# Patient Record
Sex: MALE | Race: BLACK OR AFRICAN AMERICAN | NOT HISPANIC OR LATINO | Employment: OTHER | URBAN - METROPOLITAN AREA
[De-identification: names, ages, dates, MRNs, and addresses within clinical notes are randomized per-mention and may not be internally consistent; named-entity substitution may affect disease eponyms.]

---

## 2017-03-28 RX ORDER — LISINOPRIL AND HYDROCHLOROTHIAZIDE 12.5; 2 MG/1; MG/1
TABLET ORAL
Qty: 180 TABLET | Refills: 1 | Status: ON HOLD | OUTPATIENT
Start: 2017-03-28 | End: 2022-05-31 | Stop reason: SDUPTHER

## 2019-06-11 ENCOUNTER — OFFICE VISIT (OUTPATIENT)
Dept: UROLOGY | Facility: CLINIC | Age: 79
End: 2019-06-11
Payer: MEDICARE

## 2019-06-11 ENCOUNTER — LAB VISIT (OUTPATIENT)
Dept: LAB | Facility: HOSPITAL | Age: 79
End: 2019-06-11
Attending: NURSE PRACTITIONER
Payer: MEDICARE

## 2019-06-11 ENCOUNTER — TELEPHONE (OUTPATIENT)
Dept: UROLOGY | Facility: CLINIC | Age: 79
End: 2019-06-11

## 2019-06-11 VITALS — SYSTOLIC BLOOD PRESSURE: 174 MMHG | HEIGHT: 68 IN | BODY MASS INDEX: 22.35 KG/M2 | DIASTOLIC BLOOD PRESSURE: 74 MMHG

## 2019-06-11 DIAGNOSIS — R35.0 URINARY FREQUENCY: ICD-10-CM

## 2019-06-11 DIAGNOSIS — R35.1 NOCTURIA: ICD-10-CM

## 2019-06-11 DIAGNOSIS — Z90.5 HISTORY OF NEPHRECTOMY: ICD-10-CM

## 2019-06-11 DIAGNOSIS — N40.1 BPH WITH URINARY OBSTRUCTION: ICD-10-CM

## 2019-06-11 DIAGNOSIS — N13.8 BPH WITH URINARY OBSTRUCTION: ICD-10-CM

## 2019-06-11 DIAGNOSIS — C64.1 RENAL CANCER, RIGHT: ICD-10-CM

## 2019-06-11 DIAGNOSIS — N40.1 BPH WITH URINARY OBSTRUCTION: Primary | ICD-10-CM

## 2019-06-11 DIAGNOSIS — N13.8 BPH WITH URINARY OBSTRUCTION: Primary | ICD-10-CM

## 2019-06-11 LAB
ANION GAP SERPL CALC-SCNC: 7 MMOL/L (ref 8–16)
BILIRUB SERPL-MCNC: NORMAL MG/DL
BLOOD URINE, POC: NORMAL
BUN SERPL-MCNC: 34 MG/DL (ref 8–23)
CALCIUM SERPL-MCNC: 10.1 MG/DL (ref 8.7–10.5)
CHLORIDE SERPL-SCNC: 104 MMOL/L (ref 95–110)
CO2 SERPL-SCNC: 27 MMOL/L (ref 23–29)
COLOR, POC UA: YELLOW
CREAT SERPL-MCNC: 1.5 MG/DL (ref 0.5–1.4)
EST. GFR  (AFRICAN AMERICAN): 50.8 ML/MIN/1.73 M^2
EST. GFR  (NON AFRICAN AMERICAN): 44 ML/MIN/1.73 M^2
GLUCOSE SERPL-MCNC: 106 MG/DL (ref 70–110)
GLUCOSE UR QL STRIP: NORMAL
KETONES UR QL STRIP: NORMAL
LEUKOCYTE ESTERASE URINE, POC: NORMAL
NITRITE, POC UA: NORMAL
PH, POC UA: 5
POC RESIDUAL URINE VOLUME: 38 ML (ref 0–100)
POTASSIUM SERPL-SCNC: 4 MMOL/L (ref 3.5–5.1)
PROTEIN, POC: NORMAL
SODIUM SERPL-SCNC: 138 MMOL/L (ref 136–145)
SPECIFIC GRAVITY, POC UA: 1.01
UROBILINOGEN, POC UA: NORMAL

## 2019-06-11 PROCEDURE — 3078F DIAST BP <80 MM HG: CPT | Mod: CPTII,S$GLB,, | Performed by: NURSE PRACTITIONER

## 2019-06-11 PROCEDURE — 81002 POCT URINE DIPSTICK WITHOUT MICROSCOPE: ICD-10-PCS | Mod: S$GLB,,, | Performed by: NURSE PRACTITIONER

## 2019-06-11 PROCEDURE — 84153 ASSAY OF PSA TOTAL: CPT

## 2019-06-11 PROCEDURE — 99203 PR OFFICE/OUTPT VISIT, NEW, LEVL III, 30-44 MIN: ICD-10-PCS | Mod: 25,S$GLB,, | Performed by: NURSE PRACTITIONER

## 2019-06-11 PROCEDURE — 84154 ASSAY OF PSA FREE: CPT

## 2019-06-11 PROCEDURE — 99203 OFFICE O/P NEW LOW 30 MIN: CPT | Mod: 25,S$GLB,, | Performed by: NURSE PRACTITIONER

## 2019-06-11 PROCEDURE — 99999 PR PBB SHADOW E&M-EST. PATIENT-LVL III: ICD-10-PCS | Mod: PBBFAC,,, | Performed by: NURSE PRACTITIONER

## 2019-06-11 PROCEDURE — 99999 PR PBB SHADOW E&M-EST. PATIENT-LVL III: CPT | Mod: PBBFAC,,, | Performed by: NURSE PRACTITIONER

## 2019-06-11 PROCEDURE — 3078F PR MOST RECENT DIASTOLIC BLOOD PRESSURE < 80 MM HG: ICD-10-PCS | Mod: CPTII,S$GLB,, | Performed by: NURSE PRACTITIONER

## 2019-06-11 PROCEDURE — 51798 US URINE CAPACITY MEASURE: CPT | Mod: S$GLB,,, | Performed by: NURSE PRACTITIONER

## 2019-06-11 PROCEDURE — 3077F SYST BP >= 140 MM HG: CPT | Mod: CPTII,S$GLB,, | Performed by: NURSE PRACTITIONER

## 2019-06-11 PROCEDURE — 81002 URINALYSIS NONAUTO W/O SCOPE: CPT | Mod: S$GLB,,, | Performed by: NURSE PRACTITIONER

## 2019-06-11 PROCEDURE — 3077F PR MOST RECENT SYSTOLIC BLOOD PRESSURE >= 140 MM HG: ICD-10-PCS | Mod: CPTII,S$GLB,, | Performed by: NURSE PRACTITIONER

## 2019-06-11 PROCEDURE — 80048 BASIC METABOLIC PNL TOTAL CA: CPT

## 2019-06-11 PROCEDURE — 51798 PR MEAS,POST-VOID RES,US,NON-IMAGING: ICD-10-PCS | Mod: S$GLB,,, | Performed by: NURSE PRACTITIONER

## 2019-06-11 PROCEDURE — 36415 COLL VENOUS BLD VENIPUNCTURE: CPT

## 2019-06-11 RX ORDER — OXYBUTYNIN CHLORIDE 5 MG/1
5 TABLET ORAL NIGHTLY
Qty: 30 TABLET | Refills: 11 | Status: SHIPPED | OUTPATIENT
Start: 2019-06-11 | End: 2022-05-30

## 2019-06-11 RX ORDER — TAMSULOSIN HYDROCHLORIDE 0.4 MG/1
0.4 CAPSULE ORAL DAILY
Qty: 90 CAPSULE | Refills: 3 | Status: SHIPPED | OUTPATIENT
Start: 2019-06-11 | End: 2023-06-25

## 2019-06-11 NOTE — PROGRESS NOTES
Subjective:       Patient ID: Adrian Claudet is a 78 y.o. male.    Chief Complaint: Urinary Frequency    Adrian Claudet is a 78 y.o. Male with history of BPH, RCC; s/p (R) nephrectomy 05/27/2015.  PAPILLARY RENAL CELL CARCINOMA, MICHELET GRADE 3, MAXIMUM TUMOR SIZE 4 CM,   DOES NOT INVOLVE SURGICAL MARGINS.  NO EXTRACAPSULAR EXTENSION IDENTIFIED; pT1, pN0    He was last seen in clinic with Dr. Lee 04/12/2016.  That was the last recorded imaging and labs in [facility]  04/12/2016:  BUN 29  Cr     1.85                           PSA                      1.1                 08/05/2009                He is here today due to urinary frequency/nocturia 3-4x.  FOS good for him. No dysuria or hematuria.  Has control of his urine.  He has been taking flomax for his LUTS;          Past Medical History:  No date: Bradycardia  No date: Colon polyp      Comment:  benign  No date: Diabetes mellitus  No date: Hyperlipidemia  No date: Hypertension  No date: Inflammatory bowel disease      Comment:  diverticulitis  No date: Ulcer  No date: Unspecified disorder of kidney and ureter      Comment:  right kidney mass    Past Surgical History:  No date: CHOLECYSTECTOMY  No date: COLON SURGERY  No date: NEPHRECTOMY      Comment:  right  5/27/2015: NEPHRECTOMY-LAPAROSCOPIC; Right      Comment:  Performed by Melchor Lee MD at [facility] OR [address] [place] [address]    Review of patient's family history indicates:  Problem: Cancer      Relation: Mother          Age of Onset: (Not Specified)      Social History    Socioeconomic History      Marital status:       Spouse name: Not on file      Number of children: Not on file      Years of education: Not on file      Highest education level: Not on file    Occupational History      Not on file    Social Needs      Financial resource strain: Not on file      Food insecurity:        Worry: Not on file        Inability: Not on file      Transportation needs:        Medical: Not on file        Non-medical: Not on  file    Tobacco Use      Smoking status: Never Smoker    Substance and Sexual Activity      Alcohol use: No        Alcohol/week: 0.0 oz      Drug use: Not on file      Sexual activity: Yes        Partners: Female    Lifestyle      Physical activity:        Days per week: Not on file        Minutes per session: Not on file      Stress: Not on file    Relationships      Social connections:        Talks on phone: Not on file        Gets together: Not on file        Attends Yazdanism service: Not on file        Active member of club or organization: Not on file        Attends meetings of clubs or organizations: Not on file        Relationship status: Not on file    Other Topics      Concerns:        Not on file    Social History Narrative      Not on file      Allergies:  Patient has no known allergies.    Medications:  Current Outpatient Medications:   atorvastatin (LIPITOR) 20 MG tablet, Take 20 mg by mouth once daily. , Disp: , Rfl: 0  glimepiride (AMARYL) 2 MG tablet, Take 2 mg by mouth 2 (two) times daily. , Disp: , Rfl: 3  lisinopril-hydrochlorothiazide (PRINZIDE,ZESTORETIC) 20-12.5 mg per tablet, TAKE 2 TABLETS BY MOUTH ONCE DAILY, Disp: 180 tablet, Rfl: 11  lisinopril-hydrochlorothiazide (PRINZIDE,ZESTORETIC) 20-12.5 mg per tablet, TAKE 2 TABLETS BY MOUTH ONCE DAILY, Disp: 180 tablet, Rfl: 1  metformin (GLUCOPHAGE) 500 MG tablet, Take 500 mg by mouth 2 (two) times daily with meals. , Disp: , Rfl: 3  pantoprazole (PROTONIX) 40 MG tablet, Take 40 mg by mouth once daily. , Disp: , Rfl: 5  tamsulosin (FLOMAX) 0.4 mg Cap, Take 1 capsule (0.4 mg total) by mouth once daily., Disp: 90 capsule, Rfl: 3  tramadol (ULTRAM) 50 mg tablet, Take 50 mg by mouth every 6 (six) hours as needed. , Disp: , Rfl: 2  oxybutynin (DITROPAN) 5 MG Tab, Take 1 tablet (5 mg total) by mouth every evening., Disp: 30 tablet, Rfl: 11                    Review of Systems   Constitutional: Negative for activity change, appetite change,  chills and fever.   HENT: Negative for facial swelling and trouble swallowing.    Eyes: Negative for visual disturbance.   Respiratory: Negative for chest tightness and shortness of breath.    Cardiovascular: Negative for chest pain and palpitations.   Gastrointestinal: Positive for constipation. Negative for abdominal pain, diarrhea, nausea and vomiting.   Genitourinary: Positive for frequency and nocturia. Negative for difficulty urinating, dysuria, flank pain, hematuria, penile pain, penile swelling, scrotal swelling and testicular pain.   Musculoskeletal: Negative for back pain, gait problem, myalgias and neck stiffness.   Skin: Negative for rash.   Neurological: Negative for dizziness and speech difficulty.   Hematological: Does not bruise/bleed easily.   Psychiatric/Behavioral: Negative for behavioral problems.       Objective:      Physical Exam   Nursing note and vitals reviewed.  Constitutional: He is oriented to person, place, and time. He appears well-developed and well-nourished.  Non-toxic appearance. He does not have a sickly appearance.   Urine dipped clear of infection today.  PVR in the office today was 38.       HENT:   Head: Normocephalic and atraumatic.   Right Ear: External ear normal.   Left Ear: External ear normal.   Nose: Nose normal.   Mouth/Throat: Mucous membranes are normal.   Eyes: Conjunctivae and lids are normal. No scleral icterus.   Neck: Trachea normal, normal range of motion and full passive range of motion without pain. Neck supple. No JVD present. No tracheal deviation present.   Cardiovascular: Normal rate, S1 normal and S2 normal.    Pulmonary/Chest: Effort normal. No respiratory distress. He exhibits no tenderness.   Abdominal: Soft. Normal appearance and bowel sounds are normal. There is no hepatosplenomegaly. There is no tenderness. There is no CVA tenderness.   Genitourinary: Rectum normal and penis normal.   Musculoskeletal: Normal range of motion.   Neurological: He is  alert and oriented to person, place, and time. He has normal strength.   Skin: Skin is warm, dry and intact.     Psychiatric: He has a normal mood and affect. His behavior is normal. Judgment and thought content normal.       Assessment:       1. BPH with urinary obstruction    2. Urinary frequency    3. Renal cancer, right    4. History of nephrectomy    5. Nocturia        Plan:         I spent 30 minutes with the patient of which more than half was spent in direct consultation with the patient in regards to our treatment and plan.    Education and recommendations of today's plan of care including home remedies.  We discussed the findings and recommendations  Need renal u/s and labs today.  Discussed the contributory factors for his LUTS  Reassurance no infection and no having issues with retention.  Continue Flomax; trial oxybutynin 5 mg just in the evening; discussed the benefits and side effects of the oxybutynin.  This is why we only starting lower dose.  F/u based on his labs and imaging.  Check luts once back

## 2019-06-11 NOTE — TELEPHONE ENCOUNTER
----- Message from Angy Hampton sent at 6/11/2019  8:04 AM CDT -----  Contact: Velvet- Spouse- [phone]  Bardot- pts wife called to schedule a np appt- pt trying to re est care for cancer of kidney last seen 4/12/16- wife states pt is unable to urinate and has been in some pain since the weekend- pain is occurring on the left side- please contact Velvet at [phone]

## 2019-06-13 ENCOUNTER — HOSPITAL ENCOUNTER (OUTPATIENT)
Dept: RADIOLOGY | Facility: HOSPITAL | Age: 79
Discharge: HOME | End: 2019-06-13
Attending: NURSE PRACTITIONER
Payer: MEDICARE

## 2019-06-13 DIAGNOSIS — N13.8 BPH WITH URINARY OBSTRUCTION: ICD-10-CM

## 2019-06-13 DIAGNOSIS — N40.1 BPH WITH URINARY OBSTRUCTION: ICD-10-CM

## 2019-06-13 DIAGNOSIS — R35.0 URINARY FREQUENCY: ICD-10-CM

## 2019-06-13 LAB
-2 PROPSA (PHI): 16.3 PG/ML
FREE PSA (PHI): 1.4 NG/ML
PROSTATE HEALTH INDEX VALUE (PHI): NORMAL
PSA FREE MFR SERPL: NORMAL %
PSA SERPL-MCNC: 3.5 NG/ML

## 2019-06-13 PROCEDURE — 76770 US EXAM ABDO BACK WALL COMP: CPT | Mod: TC,PO

## 2019-06-14 ENCOUNTER — TELEPHONE (OUTPATIENT)
Dept: UROLOGY | Facility: CLINIC | Age: 79
End: 2019-06-14

## 2019-06-14 NOTE — TELEPHONE ENCOUNTER
Patient notified  that his labs and u/s look great.   No sign of PCA or reoccurrence in kidney.   He clear; following the AUA guidelines   Any problems let us know; can f/u with PCP

## 2022-05-30 ENCOUNTER — HOSPITAL ENCOUNTER (OUTPATIENT)
Facility: HOSPITAL | Age: 82
Discharge: HOME | End: 2022-05-31
Attending: EMERGENCY MEDICINE | Admitting: EMERGENCY MEDICINE
Payer: MEDICARE

## 2022-05-30 DIAGNOSIS — K92.1 MELENA: ICD-10-CM

## 2022-05-30 DIAGNOSIS — D64.9 NORMOCYTIC ANEMIA: ICD-10-CM

## 2022-05-30 DIAGNOSIS — I10 HTN (HYPERTENSION), BENIGN: ICD-10-CM

## 2022-05-30 DIAGNOSIS — N18.31 STAGE 3A CHRONIC KIDNEY DISEASE: ICD-10-CM

## 2022-05-30 DIAGNOSIS — R07.9 CHEST PAIN: ICD-10-CM

## 2022-05-30 DIAGNOSIS — K92.2 UPPER GI BLEED: Primary | ICD-10-CM

## 2022-05-30 DIAGNOSIS — E78.5 HYPERLIPIDEMIA, UNSPECIFIED HYPERLIPIDEMIA TYPE: ICD-10-CM

## 2022-05-30 DIAGNOSIS — R63.4 UNINTENTIONAL WEIGHT LOSS: ICD-10-CM

## 2022-05-30 PROBLEM — N18.30 CKD (CHRONIC KIDNEY DISEASE) STAGE 3, GFR 30-59 ML/MIN: Status: ACTIVE | Noted: 2022-05-30

## 2022-05-30 LAB
ABO + RH BLD: NORMAL
ALBUMIN SERPL BCP-MCNC: 3.8 G/DL (ref 3.5–5.2)
ALP SERPL-CCNC: 45 U/L (ref 55–135)
ALT SERPL W/O P-5'-P-CCNC: 14 U/L (ref 10–44)
ANION GAP SERPL CALC-SCNC: 8 MMOL/L (ref 8–16)
APTT BLDCRRT: 25.8 SEC (ref 21–32)
AST SERPL-CCNC: 18 U/L (ref 10–40)
BASOPHILS # BLD AUTO: 0.03 K/UL (ref 0–0.2)
BASOPHILS NFR BLD: 0.9 % (ref 0–1.9)
BILIRUB SERPL-MCNC: 0.4 MG/DL (ref 0.1–1)
BLD GP AB SCN CELLS X3 SERPL QL: NORMAL
BUN SERPL-MCNC: 30 MG/DL (ref 8–23)
CALCIUM SERPL-MCNC: 9.1 MG/DL (ref 8.7–10.5)
CHLORIDE SERPL-SCNC: 103 MMOL/L (ref 95–110)
CO2 SERPL-SCNC: 26 MMOL/L (ref 23–29)
CREAT SERPL-MCNC: 1.8 MG/DL (ref 0.5–1.4)
DIFFERENTIAL METHOD: ABNORMAL
EOSINOPHIL # BLD AUTO: 0.1 K/UL (ref 0–0.5)
EOSINOPHIL NFR BLD: 3.5 % (ref 0–8)
ERYTHROCYTE [DISTWIDTH] IN BLOOD BY AUTOMATED COUNT: 14.9 % (ref 11.5–14.5)
EST. GFR  (AFRICAN AMERICAN): 40 ML/MIN/1.73 M^2
EST. GFR  (NON AFRICAN AMERICAN): 35 ML/MIN/1.73 M^2
ESTIMATED AVG GLUCOSE: 163 MG/DL (ref 68–131)
FERRITIN SERPL-MCNC: 115 NG/ML (ref 20–300)
GLUCOSE SERPL-MCNC: 222 MG/DL (ref 70–110)
HBA1C MFR BLD: 7.3 % (ref 4–5.6)
HCT VFR BLD AUTO: 29.9 % (ref 40–54)
HGB BLD-MCNC: 9.7 G/DL (ref 14–18)
IMM GRANULOCYTES # BLD AUTO: 0.01 K/UL (ref 0–0.04)
IMM GRANULOCYTES NFR BLD AUTO: 0.3 % (ref 0–0.5)
INR PPP: 1 (ref 0.8–1.2)
IRON SERPL-MCNC: 31 UG/DL (ref 45–160)
LIPASE SERPL-CCNC: 20 U/L (ref 4–60)
LYMPHOCYTES # BLD AUTO: 0.6 K/UL (ref 1–4.8)
LYMPHOCYTES NFR BLD: 18.7 % (ref 18–48)
MCH RBC QN AUTO: 31.1 PG (ref 27–31)
MCHC RBC AUTO-ENTMCNC: 32.4 G/DL (ref 32–36)
MCV RBC AUTO: 96 FL (ref 82–98)
MONOCYTES # BLD AUTO: 0.5 K/UL (ref 0.3–1)
MONOCYTES NFR BLD: 15.5 % (ref 4–15)
NEUTROPHILS # BLD AUTO: 2.1 K/UL (ref 1.8–7.7)
NEUTROPHILS NFR BLD: 61.1 % (ref 38–73)
NRBC BLD-RTO: 0 /100 WBC
OB PNL STL: NEGATIVE
PLATELET # BLD AUTO: 207 K/UL (ref 150–450)
PMV BLD AUTO: 9.7 FL (ref 9.2–12.9)
POCT GLUCOSE: 105 MG/DL (ref 70–110)
POCT GLUCOSE: 130 MG/DL (ref 70–110)
POCT GLUCOSE: 163 MG/DL (ref 70–110)
POTASSIUM SERPL-SCNC: 4.5 MMOL/L (ref 3.5–5.1)
PROT SERPL-MCNC: 6.6 G/DL (ref 6–8.4)
PROTHROMBIN TIME: 10.3 SEC (ref 9–12.5)
RBC # BLD AUTO: 3.12 M/UL (ref 4.6–6.2)
SARS-COV-2 RDRP RESP QL NAA+PROBE: NEGATIVE
SATURATED IRON: 10 % (ref 20–50)
SODIUM SERPL-SCNC: 137 MMOL/L (ref 136–145)
TOTAL IRON BINDING CAPACITY: 318 UG/DL (ref 250–450)
TRANSFERRIN SERPL-MCNC: 215 MG/DL (ref 200–375)
WBC # BLD AUTO: 3.42 K/UL (ref 3.9–12.7)

## 2022-05-30 PROCEDURE — 25000003 PHARM REV CODE 250: Performed by: STUDENT IN AN ORGANIZED HEALTH CARE EDUCATION/TRAINING PROGRAM

## 2022-05-30 PROCEDURE — 82272 OCCULT BLD FECES 1-3 TESTS: CPT | Performed by: EMERGENCY MEDICINE

## 2022-05-30 PROCEDURE — C9113 INJ PANTOPRAZOLE SODIUM, VIA: HCPCS | Performed by: EMERGENCY MEDICINE

## 2022-05-30 PROCEDURE — 83036 HEMOGLOBIN GLYCOSYLATED A1C: CPT | Performed by: STUDENT IN AN ORGANIZED HEALTH CARE EDUCATION/TRAINING PROGRAM

## 2022-05-30 PROCEDURE — 85610 PROTHROMBIN TIME: CPT | Performed by: STUDENT IN AN ORGANIZED HEALTH CARE EDUCATION/TRAINING PROGRAM

## 2022-05-30 PROCEDURE — 84466 ASSAY OF TRANSFERRIN: CPT | Performed by: STUDENT IN AN ORGANIZED HEALTH CARE EDUCATION/TRAINING PROGRAM

## 2022-05-30 PROCEDURE — G0378 HOSPITAL OBSERVATION PER HR: HCPCS

## 2022-05-30 PROCEDURE — 85025 COMPLETE CBC W/AUTO DIFF WBC: CPT | Performed by: EMERGENCY MEDICINE

## 2022-05-30 PROCEDURE — 99285 EMERGENCY DEPT VISIT HI MDM: CPT | Mod: NSCH,GC,, | Performed by: INTERNAL MEDICINE

## 2022-05-30 PROCEDURE — 96361 HYDRATE IV INFUSION ADD-ON: CPT

## 2022-05-30 PROCEDURE — 82728 ASSAY OF FERRITIN: CPT | Performed by: STUDENT IN AN ORGANIZED HEALTH CARE EDUCATION/TRAINING PROGRAM

## 2022-05-30 PROCEDURE — 63600175 PHARM REV CODE 636 W HCPCS: Performed by: STUDENT IN AN ORGANIZED HEALTH CARE EDUCATION/TRAINING PROGRAM

## 2022-05-30 PROCEDURE — 96374 THER/PROPH/DIAG INJ IV PUSH: CPT

## 2022-05-30 PROCEDURE — U0002 COVID-19 LAB TEST NON-CDC: HCPCS | Performed by: EMERGENCY MEDICINE

## 2022-05-30 PROCEDURE — 93010 EKG 12-LEAD: ICD-10-PCS | Mod: ,,, | Performed by: INTERNAL MEDICINE

## 2022-05-30 PROCEDURE — 96361 HYDRATE IV INFUSION ADD-ON: CPT | Performed by: EMERGENCY MEDICINE

## 2022-05-30 PROCEDURE — 86850 RBC ANTIBODY SCREEN: CPT | Performed by: INTERNAL MEDICINE

## 2022-05-30 PROCEDURE — 80053 COMPREHEN METABOLIC PANEL: CPT | Performed by: EMERGENCY MEDICINE

## 2022-05-30 PROCEDURE — C9113 INJ PANTOPRAZOLE SODIUM, VIA: HCPCS | Performed by: STUDENT IN AN ORGANIZED HEALTH CARE EDUCATION/TRAINING PROGRAM

## 2022-05-30 PROCEDURE — 63600175 PHARM REV CODE 636 W HCPCS: Performed by: EMERGENCY MEDICINE

## 2022-05-30 PROCEDURE — 85730 THROMBOPLASTIN TIME PARTIAL: CPT | Performed by: STUDENT IN AN ORGANIZED HEALTH CARE EDUCATION/TRAINING PROGRAM

## 2022-05-30 PROCEDURE — 99285 PR EMERGENCY DEPT VISIT,LEVEL V: ICD-10-PCS | Mod: NSCH,GC,, | Performed by: INTERNAL MEDICINE

## 2022-05-30 PROCEDURE — 99285 EMERGENCY DEPT VISIT HI MDM: CPT | Mod: 25

## 2022-05-30 PROCEDURE — 96376 TX/PRO/DX INJ SAME DRUG ADON: CPT | Performed by: EMERGENCY MEDICINE

## 2022-05-30 PROCEDURE — 93005 ELECTROCARDIOGRAM TRACING: CPT

## 2022-05-30 PROCEDURE — 82962 GLUCOSE BLOOD TEST: CPT

## 2022-05-30 PROCEDURE — 93010 ELECTROCARDIOGRAM REPORT: CPT | Mod: ,,, | Performed by: INTERNAL MEDICINE

## 2022-05-30 PROCEDURE — 83690 ASSAY OF LIPASE: CPT | Performed by: INTERNAL MEDICINE

## 2022-05-30 RX ORDER — MULTIVIT-MIN/FA/LYCOPEN/LUTEIN .4-300-25
1 TABLET ORAL DAILY
COMMUNITY

## 2022-05-30 RX ORDER — IBUPROFEN 200 MG
16 TABLET ORAL
Status: DISCONTINUED | OUTPATIENT
Start: 2022-05-30 | End: 2022-05-31 | Stop reason: HOSPADM

## 2022-05-30 RX ORDER — OXYBUTYNIN CHLORIDE 10 MG/1
10 TABLET, EXTENDED RELEASE ORAL DAILY
COMMUNITY
Start: 2022-03-31 | End: 2022-05-30 | Stop reason: ALTCHOICE

## 2022-05-30 RX ORDER — IBUPROFEN 200 MG
16 TABLET ORAL
Status: DISCONTINUED | OUTPATIENT
Start: 2022-05-30 | End: 2022-05-30

## 2022-05-30 RX ORDER — GLUCAGON 1 MG
1 KIT INJECTION
Status: DISCONTINUED | OUTPATIENT
Start: 2022-05-30 | End: 2022-05-30

## 2022-05-30 RX ORDER — DIPHENHYDRAMINE HCL 25 MG
25 CAPSULE ORAL DAILY PRN
COMMUNITY

## 2022-05-30 RX ORDER — IBUPROFEN 200 MG
24 TABLET ORAL
Status: DISCONTINUED | OUTPATIENT
Start: 2022-05-30 | End: 2022-05-30

## 2022-05-30 RX ORDER — IBUPROFEN 200 MG
24 TABLET ORAL
Status: DISCONTINUED | OUTPATIENT
Start: 2022-05-30 | End: 2022-05-31 | Stop reason: HOSPADM

## 2022-05-30 RX ORDER — GLUCAGON 1 MG
1 KIT INJECTION
Status: DISCONTINUED | OUTPATIENT
Start: 2022-05-30 | End: 2022-05-31 | Stop reason: HOSPADM

## 2022-05-30 RX ORDER — PANTOPRAZOLE SODIUM 40 MG/10ML
80 INJECTION, POWDER, LYOPHILIZED, FOR SOLUTION INTRAVENOUS DAILY
Status: DISCONTINUED | OUTPATIENT
Start: 2022-05-30 | End: 2022-05-30

## 2022-05-30 RX ORDER — SUCRALFATE 1 G/1
1 TABLET ORAL 3 TIMES DAILY
COMMUNITY
Start: 2022-05-20

## 2022-05-30 RX ORDER — NALOXONE HCL 0.4 MG/ML
0.02 VIAL (ML) INJECTION
Status: DISCONTINUED | OUTPATIENT
Start: 2022-05-30 | End: 2022-05-31 | Stop reason: HOSPADM

## 2022-05-30 RX ORDER — SODIUM CHLORIDE 0.9 % (FLUSH) 0.9 %
10 SYRINGE (ML) INJECTION EVERY 12 HOURS PRN
Status: DISCONTINUED | OUTPATIENT
Start: 2022-05-30 | End: 2022-05-31 | Stop reason: HOSPADM

## 2022-05-30 RX ORDER — FLUTICASONE PROPIONATE 50 MCG
2 SPRAY, SUSPENSION (ML) NASAL DAILY
Status: DISCONTINUED | OUTPATIENT
Start: 2022-05-30 | End: 2022-05-31 | Stop reason: HOSPADM

## 2022-05-30 RX ORDER — ACETAMINOPHEN 500 MG
500 TABLET ORAL EVERY 6 HOURS PRN
COMMUNITY

## 2022-05-30 RX ORDER — HYDRALAZINE HYDROCHLORIDE 25 MG/1
25 TABLET, FILM COATED ORAL ONCE
Status: COMPLETED | OUTPATIENT
Start: 2022-05-30 | End: 2022-05-30

## 2022-05-30 RX ORDER — FERROUS SULFATE 325(65) MG
325 TABLET, DELAYED RELEASE (ENTERIC COATED) ORAL DAILY
COMMUNITY

## 2022-05-30 RX ORDER — TAMSULOSIN HYDROCHLORIDE 0.4 MG/1
0.4 CAPSULE ORAL DAILY
Status: DISCONTINUED | OUTPATIENT
Start: 2022-05-30 | End: 2022-05-31 | Stop reason: HOSPADM

## 2022-05-30 RX ORDER — INSULIN ASPART 100 [IU]/ML
0-5 INJECTION, SOLUTION INTRAVENOUS; SUBCUTANEOUS
Status: DISCONTINUED | OUTPATIENT
Start: 2022-05-30 | End: 2022-05-31 | Stop reason: HOSPADM

## 2022-05-30 RX ORDER — PANTOPRAZOLE SODIUM 40 MG/10ML
40 INJECTION, POWDER, LYOPHILIZED, FOR SOLUTION INTRAVENOUS 2 TIMES DAILY
Status: DISCONTINUED | OUTPATIENT
Start: 2022-05-30 | End: 2022-05-31 | Stop reason: HOSPADM

## 2022-05-30 RX ORDER — INSULIN ASPART 100 [IU]/ML
0-5 INJECTION, SOLUTION INTRAVENOUS; SUBCUTANEOUS
Status: DISCONTINUED | OUTPATIENT
Start: 2022-05-30 | End: 2022-05-30

## 2022-05-30 RX ORDER — CETIRIZINE HYDROCHLORIDE 5 MG/1
5 TABLET ORAL DAILY
Status: DISCONTINUED | OUTPATIENT
Start: 2022-05-30 | End: 2022-05-31 | Stop reason: HOSPADM

## 2022-05-30 RX ADMIN — SODIUM CHLORIDE, SODIUM LACTATE, POTASSIUM CHLORIDE, AND CALCIUM CHLORIDE 1000 ML: .6; .31; .03; .02 INJECTION, SOLUTION INTRAVENOUS at 03:05

## 2022-05-30 RX ADMIN — PANTOPRAZOLE SODIUM 40 MG: 40 INJECTION, POWDER, LYOPHILIZED, FOR SOLUTION INTRAVENOUS at 08:05

## 2022-05-30 RX ADMIN — CETIRIZINE HYDROCHLORIDE 5 MG: 5 TABLET, FILM COATED ORAL at 05:05

## 2022-05-30 RX ADMIN — HYDRALAZINE HYDROCHLORIDE 25 MG: 25 TABLET, FILM COATED ORAL at 07:05

## 2022-05-30 RX ADMIN — PANTOPRAZOLE SODIUM 80 MG: 40 INJECTION, POWDER, FOR SOLUTION INTRAVENOUS at 01:05

## 2022-05-30 RX ADMIN — TAMSULOSIN HYDROCHLORIDE 0.4 MG: 0.4 CAPSULE ORAL at 03:05

## 2022-05-30 NOTE — PHARMACY MED REC
"  Admission Medication History     The home medication history was taken by Edelmira Ramsey CPhT.    Medication history obtained from, Patient's Wife Verified    You may go to "Admission" then "Reconcile Home Medications" tabs to review and/or act upon these items.      The home medication list has been updated by the Pharmacy department.    Please read ALL comments highlighted in yellow.    Please address this information as you see fit.     Feel free to contact us if you have any questions or require assistance.      The medications listed below were removed from the home medication list.  Please reorder if appropriate:  Patient reports no longer taking the following medication(s):   Atorvastatin 20 mg   Oxybutynin 5 mg    Edelmira Ramsey CPhT.  Ext [phone]              .          "

## 2022-05-30 NOTE — ED TRIAGE NOTES
Adrian Claudet, an 81 y.o. male presents to the ED  With a  complaint of fatigue, weight loss, trouble, anemia, trouble wth BP and diarrhea.      Review of patient's allergies indicates:  No Known Allergies  Chief Complaint   Patient presents with    multiple complaints     Pt reports he was unable to check his blood pressure at home because he's out of batteries. Pt also states he's anemic and has had dark stool for several months. Pt also has sinus congestion and arthritis pain.      Past Medical History:   Diagnosis Date    Bradycardia     Colon polyp     benign    Diabetes mellitus     Hyperlipidemia     Hypertension     Inflammatory bowel disease     diverticulitis    Ulcer     Unspecified disorder of kidney and ureter     right kidney mass       Patient identifiers verified and correct.     LOC: The patient is awake, alert and aware of environment with an appropriate affect, the patient is oriented x 3 and speaking appropriately.     APPEARANCE: Patient appears comfortable and in no acute distress, patient is clean and well groomed.    HEENT: Head symmetrical. Eyes bilateral.  Bilateral ears without drainage. Bilateral nares patent, throat clear.    SKIN: The skin is warm and dry, color consistent with ethnicity, patient has normal skin turgor and moist mucus membranes, skin intact, no breakdown or bruising noted.     MUSCULOSKELETAL: Patient moving all extremities spontaneously, no swelling noted.    RESPIRATORY: Airway is open and patent, respirations are spontaneous, patient has a normal effort and rate, no accessory muscle use noted.     CARDIAC: Patient has a normal rate and regular rhythm, no edema noted, capillary refill < 3 seconds.     GASTRO: Abdomen soft and non-distended. No tenderness noted on palpation in all four quadrants, normoactive bowel sounds present in all four quadrants.     NEURO: Pt opens eyes spontaneously pupils equal, round, and reactive. behavior appropriate to  situation, follows commands, facial expression symmetrical,    NEUROVASCULAR: All extremities are warm and pink.       Will continue to monitor.

## 2022-05-30 NOTE — ED PROVIDER NOTES
"Chief Complaint: black tarry stools     History of Present Illness:    Adrian Claudet 81 y.o. with a  has a past medical history of Bradycardia, Colon polyp, Diabetes mellitus, Hyperlipidemia, Hypertension, Inflammatory bowel disease, Ulcer, and Unspecified disorder of kidney and ureter. who presents to the emergency department today with a complaint of dark tarry stools. He has had a bleeding ulcer in the past that required interventions. He has had on and off black stools for a few months but getting worse. He denies any excessive NSAID/ETOH use. Denies abdominal pain. He has had some nasal congestion. No syncope, no lightheadedness.        ROS    Constitutional: No fever, no chills.  Eyes: No discharge. No pain.  HENT: No nasal drainage. No ear ache. No sore throat.  Cardiovascular: No chest pain, no palpitations.  Respiratory: No cough, no shortness of breath.  Gastrointestinal: No abdominal pain, no vomiting. No diarrhea.  Genitourinary: No hematuria, dysuria, urgency.  Musculoskeletal: No back pain.   Skin: No rashes, no lesions.  Neurological: No headache, no focal weakness.    Otherwise remaining ROS negative     The history is provided by the patient      ALLERGIES REVIEWED  MEDICATIONS REVIEWED  PMH/PSH/SOC/FH REVIEWED :  Adrian Claudet  has a past medical history of Bradycardia, Colon polyp, Diabetes mellitus, Hyperlipidemia, Hypertension, Inflammatory bowel disease, Ulcer, and Unspecified disorder of kidney and ureter. and   has a past surgical history that includes Cholecystectomy; Colon surgery; and Nephrectomy. with  reports that he has never smoked. He does not have any smokeless tobacco history on file. He reports that he does not drink alcohol. and a family history includes Cancer in his mother.      Nursing/Ancillary staff note reviewed.  VS reviewed         Physical Exam     BP (!) 178/78   Pulse (!) 51   Temp 97.6 °F (36.4 °C) (Oral)   Resp 18   Ht 5' 7" (1.702 m)   Wt 61.7 kg (136 lb)   " SpO2 100%   BMI 21.30 kg/m²     Physical Exam    General Appearance: The patient is alert, hard of hearing, has no immediate need for airway protection and no signs of toxicity. No acute distress. Lying in bed but able to sit up without difficulty.   HEENT: Eyes: Pupils equal and round no pallor or injection. Extra ocular movements intact. No drainage.       Mouth: Mucous membranes are moist. Oropharynx clear.   Neck:Neck is supple non-tender. No lymphadenopathy. No stridor.   Respiratory: There are no retractions, lungs are clear to auscultation. No wheezing, no crackles. Chest wall nontender to palpation.   Cardiovascular: Regular rate and rhythm. No murmurs, rubs or gallops.  Gastrointestinal:  Abdomen is soft and non-tender, no masses, bowel sounds normal. No guarding, no rebound.  No pulsatile mass.   Neurological: Alert and oriented x 4. CN II-XII grossly intact. No focal weakness. Strength intact 5/5 bilaterally in upper and lower extremities.   Skin: Warm and dry, no rashes.  Musculoskeletal: Extremities are non-tender, non-swollen and have full range of motion. Back NTTP along the midline.   Rectal: normal tone, black stool in vault     DIFFERENTIAL DIAGNOSIS: After history and physical exam a differential diagnosis was considered, but was not limited to, gastritis, esophagitis, ulcer, Mayra Soledad tear, esophageal varices, enteritis, colitis, anticoagulation toxicity, tumor, AVM.           I independently interpreted the lab results and it showed the following: Anemic at 9.7/29.9 cmp with CKD          ED Course     Medications   tamsulosin 24 hr capsule 0.4 mg (has no administration in time range)   sodium chloride 0.9% flush 10 mL (has no administration in time range)   naloxone 0.4 mg/mL injection 0.02 mg (has no administration in time range)   dextrose 10% bolus 125 mL (has no administration in time range)   dextrose 10% bolus 250 mL (has no administration in time range)   pantoprazole injection 40 mg  (has no administration in time range)   lactated ringers bolus 1,000 mL (has no administration in time range)   glucose chewable tablet 16 g (has no administration in time range)   glucose chewable tablet 24 g (has no administration in time range)   glucagon (human recombinant) injection 1 mg (has no administration in time range)   insulin aspart U-100 pen 0-5 Units (has no administration in time range)         ED Course as of 05/30/22 1434   Mon May 30, 2022   1340 I spoke with [facility] re:the pts presentation and workup and they will accept for admission.  [JA]   1429 I spoke with [facility], Dr Vásquez, they will follow along.  [JA]      ED Course User Index  [JA] Guilherme Shipley MD              Medical Decision Making:   History:   I obtained history from:  The patient  Old Medical Records: I decided to obtain old medical records.   Reviewed and summarized the old medical record and it showed: pts PCP is Ly Bonilla MD.           Clinical Tests:   I have ordered and independently interpreted Lab Tests: Ordered and Reviewed        ED Management:  Adrian Claudet  presents to the emergency Department today with melena. He has anemia on labs no need for transfusion at this time however given history of bleeding ulcer and his increased melena it is in his best interest to be admitted for further care and serial Hgb/Hcts. Pt is in agreement with plan.          Voice recognition software utilized in this note.              Impression    The primary encounter diagnosis was Upper GI bleed. Diagnoses of Melena were also pertinent to this visit.                   Guilherme Shipley MD  05/30/22 1436

## 2022-05-30 NOTE — CONSULTS
"[facility]    CC: melena    HPI 81 y.o. male with PMH significant for HTN, CKD3b, right RCC, bradycardia GI consulted for subacute, persistent, mild melena associated with normocytic anemia.      Past Medical History  Past Medical History:   Diagnosis Date    Bradycardia     Colon polyp     benign    Diabetes mellitus     Hyperlipidemia     Hypertension     Inflammatory bowel disease     diverticulitis    Ulcer     Unspecified disorder of kidney and ureter     right kidney mass       Past Surgical History  Past Surgical History:   Procedure Laterality Date    CHOLECYSTECTOMY      COLON SURGERY      NEPHRECTOMY      right       Social History  Social History     Tobacco Use    Smoking status: Never Smoker   Substance Use Topics    Alcohol use: No     Alcohol/week: 0.0 standard drinks       Family History  No FHx of GI malignancy    Review of Systems  General ROS: +weight loss. negative for chills, fever  Psychological ROS: negative for hallucination, depression, anxiety, or suicidal ideation  Ophthalmic ROS: negative for blurry vision, photophobia or eye pain  ENT ROS: negative for epistaxis, sore throat or rhinorrhea  Respiratory ROS: no cough, shortness of breath, or wheezing  Cardiovascular ROS: +GUDINO. No chest pain  Gastrointestinal ROS: +melena. No abdominal pain, change in bowel habits, nausea, emesis, hematochezia  Musculoskeletal ROS: +generalized weakness. No arthralgias  Neurological ROS: no syncope or seizures; no ataxia  Dermatological ROS: negative for pruritis, rash and jaundice    Physical Examination  BP (!) 178/78   Pulse (!) 51   Temp 97.6 °F (36.4 °C) (Oral)   Resp 18   Ht 5' 7" (1.702 m)   Wt 61.7 kg (136 lb)   SpO2 100%   BMI 21.30 kg/m²   General appearance: alert, cooperative, thin  HENT: Normocephalic, atraumatic, neck symmetrical, no nasal discharge   Eyes: no scleral icterus, PERRL, EOM's intact  Heart: bradycardic, regular rhythm without rub; no displacement of " the PMI   Abdomen: soft, non-tender; non-distended, dullness to percussion, bowel sounds normoactive; no organomegaly  Extremities: extremities symmetric; no clubbing, cyanosis, or edema  Integument: Skin color, texture, turgor normal; no rashes  Neurologic: Alert and oriented X 3, normal sensation, normal coordination  Psychiatric: no pressured speech; normal affect; no evidence of impaired cognition     Recent Labs   Lab 05/30/22  1046   WBC 3.42*   RBC 3.12*   HGB 9.7*   HCT 29.9*      MCV 96   MCH 31.1*   MCHC 32.4       Recent Labs   Lab 05/30/22  1046      K 4.5      CO2 26   *   BUN 30*   CREATININE 1.8*     Recent Labs   Lab 05/30/22  1046   PROT 6.6   ALBUMIN 3.8   BILITOT 0.4   AST 18   ALT 14   ALKPHOS 45*       Discussion of test results with performing/ordering provider    Additional history obtained from wife at bedside    Review and summarization of old records    Assessment:   81 y.o. male with PMH significant for HTN, right RCC, CKD3b, bradycardia GI consulted for melena with anemia. Colonoscopy 10 years ago reportedly normal. Denies NSAIDs.    #Melena - ddx includes oral iron induced vs GAVE vs PHG vs gastritis vs esophagitis vs malignancy  #Normocytica Anemia - likely primarily from AOCD/AOCI. Baseline Hb unclear (~10.5 in 2015)    Plan:   - will discuss endoscopic planning with staff  - ate solid food this morning  - NPO at midnight  - Maintain active type and screen  - Two large bore IV's  - Trend Hb and transfuse for goal >7-8 (as indicated by co-morbidities), or >10 if symptomatic (MI or orthostatic/tachycardia not responding to IVF's)  - IV PPI 40mg BID  - Hold AC, SCDs for DVT ppx only  - Ensure up to date coags  - Check iron studies, ferritin if not obtained recently      Attestation to follow which may differ from above plan. Please appreciate.    Duane Vásquez MD  [facility]

## 2022-05-30 NOTE — H&P
[facility] Medicine H&P Note     Admitting Team: [facility] Hospitalist Team A  Attending Physician: Elvi Moore M.D.  Resident: MORAIMA Vincent M.D.  Intern: MARY Kern M.D.    Date of Admit: 5/30/2022    Chief Complaint     Dark stool for past 2-3 months     Subjective:      History of Present Illness:  Adrian Claudet is a 81 y.o. male with a PMHx  has a past medical history of Bradycardia, Colon polyp, Diabetes mellitus, Hyperlipidemia, Hypertension, Inflammatory bowel disease, Ulcer, and Unspecified disorder of kidney and ureter.. The patient presented to [facility] on 5/30/2022 with a primary complaint of dark colored stools for past 2-3 months.    The patient is complaining of several months of having black colored stools.  He complains of intermittent maroon colored stools as well.  He complains of epigastric pain.  He denies nausea or vomiting or changes to his bowel habits.  Denies dizziness, light headedness or weakness and is able to cut grass and work in his garden daily as he has done in the past.  He has a history of arthritis in which he takes tylenol PRN and Ultram.  He denies NSAIDs.  He and his wife do note, however, that he has lost a significant amount of weight and has early satiety over the past year.  He has a previous history of renal cell carcinoma s/p nephrectomy but has not followed with nephrology in about 3 years.  Of note, he was prescribed sucralfate on 5/20 by his PCP.    Past Medical History:  Past Medical History:   Diagnosis Date    Bradycardia     Colon polyp     benign    Diabetes mellitus     Hyperlipidemia     Hypertension     Inflammatory bowel disease     diverticulitis    Ulcer (40 years ago 2/2 EtOH use)     Unspecified disorder of kidney and ureter     Carcinoma of the right kidney        Past Surgical History:  Past Surgical History:   Procedure Laterality Date    CHOLECYSTECTOMY      COLON SURGERY      NEPHRECTOMY      right       Allergies:  Review of patient's allergies  "indicates:  No Known Allergies    Home Medications:  Glimepiride 2 mg BID  Lisinopril-HCTZ 20-12.5 mg daily   Metformin 500 BID  Protonix 40 mg daily   Flomax 0.4 mg daily   Tramadol 50 mg PRN      Family History:  Family History   Problem Relation Age of Onset    Cancer Mother        Social History:  Social History     Tobacco Use    Smoking status: In college    Substance Use Topics    Alcohol use: No (has not drank in 40 years)     Alcohol/week: 0.0 standard drinks       Review of Systems:  Review of Systems   Constitutional: Positive for weight loss. Negative for chills and fever.   HENT: Negative.    Eyes: Negative.    Respiratory: Negative for hemoptysis and shortness of breath.    Cardiovascular: Negative.    Gastrointestinal: Positive for heartburn. Negative for abdominal pain, nausea and vomiting.        Black stools for 2-3 months    Genitourinary: Negative.    Musculoskeletal: Negative.    Skin: Negative.    Neurological: Negative.  Negative for dizziness and weakness.   Endo/Heme/Allergies: Negative.    Psychiatric/Behavioral: Negative.    All other systems reviewed and are negative.        Health Maintaince :   Primary Care Physician:GABE Bonilla MD    Immunizations:  TDap, Flu, not UTD  Pna: unsure per patient   COVID UTD x3    Cancer Screening:  Colonoscopy: within past 10 years per patient; unsure of results      Objective:   Last 24 Hour Vital Signs:  BP  Min: 130/62  Max: 178/78  Temp  Av.6 °F (36.4 °C)  Min: 97.6 °F (36.4 °C)  Max: 97.6 °F (36.4 °C)  Pulse  Av  Min: 51  Max: 74  Resp  Av  Min: 18  Max: 18  SpO2  Av.8 %  Min: 99 %  Max: 100 %  Height  Av' 7" (170.2 cm)  Min: 5' 7" (170.2 cm)  Max: 5' 7" (170.2 cm)  Weight  Av.7 kg (136 lb)  Min: 61.7 kg (136 lb)  Max: 61.7 kg (136 lb)  Body mass index is 21.3 kg/m².  No intake/output data recorded.    Physical Examination:  Physical Exam  Vitals and nursing note reviewed.   Constitutional:       General: He is awake. " He is not in acute distress.     Appearance: He is underweight.   HENT:      Mouth/Throat:      Mouth: Mucous membranes are moist.      Pharynx: Oropharynx is clear.   Eyes:      General: No scleral icterus.     Extraocular Movements: Extraocular movements intact.      Conjunctiva/sclera: Conjunctivae normal.   Cardiovascular:      Rate and Rhythm: Regular rhythm. Bradycardia present.      Pulses: Normal pulses.      Heart sounds: Normal heart sounds. No murmur heard.  Pulmonary:      Effort: Pulmonary effort is normal. No respiratory distress.      Breath sounds: Normal breath sounds.   Abdominal:      General: Abdomen is flat. Bowel sounds are normal. There is no distension.      Palpations: Abdomen is soft.   Musculoskeletal:         General: Normal range of motion.      Cervical back: Normal range of motion and neck supple.      Right lower leg: No edema.      Left lower leg: No edema.   Skin:     General: Skin is warm and dry.      Capillary Refill: Capillary refill takes less than 2 seconds.   Neurological:      General: No focal deficit present.      Mental Status: He is alert and oriented to person, place, and time.   Psychiatric:         Mood and Affect: Mood normal.         Behavior: Behavior normal.       Laboratory:  CBC:   Lab Results   Component Value Date    WBC 3.42 (L) 05/30/2022    HGB 9.7 (L) 05/30/2022    HCT 29.9 (L) 05/30/2022     05/30/2022    MCV 96 05/30/2022    RDW 14.9 (H) 05/30/2022     BMP:   Lab Results   Component Value Date     05/30/2022    K 4.5 05/30/2022     05/30/2022    CO2 26 05/30/2022    BUN 30 (H) 05/30/2022    CREATININE 1.8 (H) 05/30/2022     (H) 05/30/2022    CALCIUM 9.1 05/30/2022     LFTs:   Lab Results   Component Value Date    PROT 6.6 05/30/2022    ALBUMIN 3.8 05/30/2022    BILITOT 0.4 05/30/2022    AST 18 05/30/2022    ALKPHOS 45 (L) 05/30/2022    ALT 14 05/30/2022     Coags:   Lab Results   Component Value Date    INR 1.0 05/18/2015      DM:   Lab Results   Component Value Date    HGBA1C 7.9 (H) 05/18/2015    CREATININE 1.8 (H) 05/30/2022     Anemia: No results found for: IRON, TIBC, FERRITIN, QYQCVTYB25, FOLATE  Cardiac:   Lab Results   Component Value Date    TROPONINI <0.006 04/07/2015     Urinalysis:   Lab Results   Component Value Date    COLORU yellow 06/11/2019    SPECGRAV 1.015 06/11/2019    NITRITE neg 06/11/2019    KETONESU neg 06/11/2019    UROBILINOGEN neg 06/11/2019       Trended Lab Data:  Recent Labs   Lab 05/30/22  1046   WBC 3.42*   HGB 9.7*   HCT 29.9*      MCV 96   RDW 14.9*      K 4.5      CO2 26   BUN 30*   CREATININE 1.8*   *   PROT 6.6   ALBUMIN 3.8   BILITOT 0.4   AST 18   ALKPHOS 45*   ALT 14       Radiology:  Imaging Results    None         Assessment:     Adrian Claudet is a 81 y.o. male with with a PMHx of Bradycardia, Colon polyp, Diabetes mellitus, Hyperlipidemia, Hypertension, Inflammatory bowel disease, gastric ulcer, and renal cell carcinoma of the right kidney s/p nephrectomy admitted to [facility] for GI bleed.       Plan:     GI bleed with epigastric pain  2-3 months dark stool with epigastric pain  H/H 9.7/23.9 on admission   Protonix 80 mg IV x1 in ED  Occult blood negative in ED  PLAN  Protonix 40 mg IV BID  GI consulted;  EGD tomorrow   Type and screen with 2 lg bore PIV   Lipase pending      Unintentional weight loss in setting of hx of Renal cell carcinoma   History of RCC s/p right nephrectomy in 2015  15-20 lbs unintentional weight loss  Colonoscopy within last 10 yrs per patient/ unknown results   PLAN  Will have EGD on 5/31  Will need CT abd/pelvis with IV contrast to rule out possible metastatic disease     Normocytic, asymptomatic anemia in setting of GI bleed  H/H 9.7/23.9 on admission  MCV 96  PLAN  GI consult placed; see above   Iron studies   Transfusion goal >7    YUNIOR vs CKD 3b   Baseline 1.5 (3 yrs ago); 1.8 on admission  1 L LR bolus x1  Continue to monitor   PCP  follow-up     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  On metformin 500 BID and Glimepiride 2 mg BID at home   Glucose 222 on admission  PLAN  A1c pending   Sliding scale insulin while admitted     HTN  On home lisinopril-hctz 20-12.5 mg daily   Will hold antihypertensive medications at this time in setting of YUNIOR vs CKD 3b  Will continue to monitor and consider restarting home medications when appropriate     HCM  Lipid panel   UTD on COVID   Needs flu and Tdap   UTD on PNA vaccine   Will need colonoscopy if not done during admission         Diet: diabetic; NPO at midnight   Code: full  PPx: held in light of GI bleed   Dispo: pending further work-up      Luis Alberto Kern MD  [facility]    [facility] Medicine Hospitalist Pager numbers:   [facility] Hospitalist Medicine Team A (Teresa/Estephanie): [phone]  [facility] Hospitalist Medicine Team B (Adalberto/Elise):  [phone]

## 2022-05-31 ENCOUNTER — ANESTHESIA (OUTPATIENT)
Dept: ENDOSCOPY | Facility: HOSPITAL | Age: 82
End: 2022-05-31
Payer: MEDICARE

## 2022-05-31 ENCOUNTER — ANESTHESIA EVENT (OUTPATIENT)
Dept: ENDOSCOPY | Facility: HOSPITAL | Age: 82
End: 2022-05-31
Payer: MEDICARE

## 2022-05-31 ENCOUNTER — TELEPHONE (OUTPATIENT)
Dept: PRIMARY CARE CLINIC | Facility: CLINIC | Age: 82
End: 2022-05-31
Payer: MEDICARE

## 2022-05-31 VITALS
BODY MASS INDEX: 19.24 KG/M2 | WEIGHT: 122.56 LBS | TEMPERATURE: 98 F | RESPIRATION RATE: 16 BRPM | DIASTOLIC BLOOD PRESSURE: 69 MMHG | HEIGHT: 67 IN | OXYGEN SATURATION: 100 % | SYSTOLIC BLOOD PRESSURE: 159 MMHG | HEART RATE: 75 BPM

## 2022-05-31 LAB
ALBUMIN SERPL BCP-MCNC: 3.5 G/DL (ref 3.5–5.2)
ALP SERPL-CCNC: 45 U/L (ref 55–135)
ALT SERPL W/O P-5'-P-CCNC: 15 U/L (ref 10–44)
ANION GAP SERPL CALC-SCNC: 8 MMOL/L (ref 8–16)
AST SERPL-CCNC: 17 U/L (ref 10–40)
BASOPHILS # BLD AUTO: 0.03 K/UL (ref 0–0.2)
BASOPHILS NFR BLD: 0.9 % (ref 0–1.9)
BILIRUB SERPL-MCNC: 0.4 MG/DL (ref 0.1–1)
BUN SERPL-MCNC: 22 MG/DL (ref 8–23)
CALCIUM SERPL-MCNC: 9.1 MG/DL (ref 8.7–10.5)
CHLORIDE SERPL-SCNC: 105 MMOL/L (ref 95–110)
CHOLEST SERPL-MCNC: 183 MG/DL (ref 120–199)
CHOLEST/HDLC SERPL: 2.3 {RATIO} (ref 2–5)
CO2 SERPL-SCNC: 27 MMOL/L (ref 23–29)
CREAT SERPL-MCNC: 1.4 MG/DL (ref 0.5–1.4)
DIFFERENTIAL METHOD: ABNORMAL
EOSINOPHIL # BLD AUTO: 0.2 K/UL (ref 0–0.5)
EOSINOPHIL NFR BLD: 5 % (ref 0–8)
ERYTHROCYTE [DISTWIDTH] IN BLOOD BY AUTOMATED COUNT: 14.7 % (ref 11.5–14.5)
EST. GFR  (AFRICAN AMERICAN): 54 ML/MIN/1.73 M^2
EST. GFR  (NON AFRICAN AMERICAN): 47 ML/MIN/1.73 M^2
GLUCOSE SERPL-MCNC: 102 MG/DL (ref 70–110)
HCT VFR BLD AUTO: 29.3 % (ref 40–54)
HDLC SERPL-MCNC: 79 MG/DL (ref 40–75)
HDLC SERPL: 43.2 % (ref 20–50)
HGB BLD-MCNC: 9.7 G/DL (ref 14–18)
IMM GRANULOCYTES # BLD AUTO: 0 K/UL (ref 0–0.04)
IMM GRANULOCYTES NFR BLD AUTO: 0 % (ref 0–0.5)
LDLC SERPL CALC-MCNC: 92.2 MG/DL (ref 63–159)
LYMPHOCYTES # BLD AUTO: 0.9 K/UL (ref 1–4.8)
LYMPHOCYTES NFR BLD: 26.5 % (ref 18–48)
MCH RBC QN AUTO: 31 PG (ref 27–31)
MCHC RBC AUTO-ENTMCNC: 33.1 G/DL (ref 32–36)
MCV RBC AUTO: 94 FL (ref 82–98)
MONOCYTES # BLD AUTO: 0.4 K/UL (ref 0.3–1)
MONOCYTES NFR BLD: 13 % (ref 4–15)
NEUTROPHILS # BLD AUTO: 1.9 K/UL (ref 1.8–7.7)
NEUTROPHILS NFR BLD: 54.6 % (ref 38–73)
NONHDLC SERPL-MCNC: 104 MG/DL
NRBC BLD-RTO: 0 /100 WBC
PLATELET # BLD AUTO: 210 K/UL (ref 150–450)
PMV BLD AUTO: 9.8 FL (ref 9.2–12.9)
POCT GLUCOSE: 107 MG/DL (ref 70–110)
POTASSIUM SERPL-SCNC: 4.8 MMOL/L (ref 3.5–5.1)
PROT SERPL-MCNC: 6 G/DL (ref 6–8.4)
RBC # BLD AUTO: 3.13 M/UL (ref 4.6–6.2)
SODIUM SERPL-SCNC: 140 MMOL/L (ref 136–145)
TRIGL SERPL-MCNC: 59 MG/DL (ref 30–150)
WBC # BLD AUTO: 3.39 K/UL (ref 3.9–12.7)

## 2022-05-31 PROCEDURE — 25500020 PHARM REV CODE 255: Performed by: INTERNAL MEDICINE

## 2022-05-31 PROCEDURE — 43235 EGD DIAGNOSTIC BRUSH WASH: CPT | Performed by: INTERNAL MEDICINE

## 2022-05-31 PROCEDURE — G0378 HOSPITAL OBSERVATION PER HR: HCPCS

## 2022-05-31 PROCEDURE — 36415 COLL VENOUS BLD VENIPUNCTURE: CPT | Performed by: STUDENT IN AN ORGANIZED HEALTH CARE EDUCATION/TRAINING PROGRAM

## 2022-05-31 PROCEDURE — 25000003 PHARM REV CODE 250: Performed by: NURSE ANESTHETIST, CERTIFIED REGISTERED

## 2022-05-31 PROCEDURE — 25000003 PHARM REV CODE 250: Performed by: STUDENT IN AN ORGANIZED HEALTH CARE EDUCATION/TRAINING PROGRAM

## 2022-05-31 PROCEDURE — 25000242 PHARM REV CODE 250 ALT 637 W/ HCPCS: Performed by: STUDENT IN AN ORGANIZED HEALTH CARE EDUCATION/TRAINING PROGRAM

## 2022-05-31 PROCEDURE — 85025 COMPLETE CBC W/AUTO DIFF WBC: CPT | Performed by: STUDENT IN AN ORGANIZED HEALTH CARE EDUCATION/TRAINING PROGRAM

## 2022-05-31 PROCEDURE — 96376 TX/PRO/DX INJ SAME DRUG ADON: CPT | Mod: 59 | Performed by: EMERGENCY MEDICINE

## 2022-05-31 PROCEDURE — 43235 PR EGD, FLEX, DIAGNOSTIC: ICD-10-PCS | Mod: NSCH,,, | Performed by: INTERNAL MEDICINE

## 2022-05-31 PROCEDURE — C9113 INJ PANTOPRAZOLE SODIUM, VIA: HCPCS | Performed by: STUDENT IN AN ORGANIZED HEALTH CARE EDUCATION/TRAINING PROGRAM

## 2022-05-31 PROCEDURE — 37000008 HC ANESTHESIA 1ST 15 MINUTES: Performed by: INTERNAL MEDICINE

## 2022-05-31 PROCEDURE — 63600175 PHARM REV CODE 636 W HCPCS: Performed by: NURSE ANESTHETIST, CERTIFIED REGISTERED

## 2022-05-31 PROCEDURE — 80061 LIPID PANEL: CPT | Performed by: STUDENT IN AN ORGANIZED HEALTH CARE EDUCATION/TRAINING PROGRAM

## 2022-05-31 PROCEDURE — 37000009 HC ANESTHESIA EA ADD 15 MINS: Performed by: INTERNAL MEDICINE

## 2022-05-31 PROCEDURE — 43235 EGD DIAGNOSTIC BRUSH WASH: CPT | Mod: NSCH,,, | Performed by: INTERNAL MEDICINE

## 2022-05-31 PROCEDURE — 80053 COMPREHEN METABOLIC PANEL: CPT | Performed by: STUDENT IN AN ORGANIZED HEALTH CARE EDUCATION/TRAINING PROGRAM

## 2022-05-31 PROCEDURE — 63600175 PHARM REV CODE 636 W HCPCS: Performed by: STUDENT IN AN ORGANIZED HEALTH CARE EDUCATION/TRAINING PROGRAM

## 2022-05-31 RX ORDER — PROPOFOL 10 MG/ML
VIAL (ML) INTRAVENOUS
Status: DISCONTINUED | OUTPATIENT
Start: 2022-05-31 | End: 2022-05-31

## 2022-05-31 RX ORDER — IODIXANOL 320 MG/ML
75 INJECTION, SOLUTION INTRAVASCULAR
Status: COMPLETED | OUTPATIENT
Start: 2022-05-31 | End: 2022-05-31

## 2022-05-31 RX ORDER — LISINOPRIL AND HYDROCHLOROTHIAZIDE 12.5; 2 MG/1; MG/1
1 TABLET ORAL DAILY
Qty: 90 TABLET | Refills: 3 | Status: SHIPPED | OUTPATIENT
Start: 2022-05-31 | End: 2022-06-30

## 2022-05-31 RX ORDER — LIDOCAINE HCL/PF 100 MG/5ML
SYRINGE (ML) INTRAVENOUS
Status: DISCONTINUED | OUTPATIENT
Start: 2022-05-31 | End: 2022-05-31

## 2022-05-31 RX ADMIN — PROPOFOL 40 MG: 10 INJECTION, EMULSION INTRAVENOUS at 03:05

## 2022-05-31 RX ADMIN — PROPOFOL 30 MG: 10 INJECTION, EMULSION INTRAVENOUS at 03:05

## 2022-05-31 RX ADMIN — PANTOPRAZOLE SODIUM 40 MG: 40 INJECTION, POWDER, LYOPHILIZED, FOR SOLUTION INTRAVENOUS at 08:05

## 2022-05-31 RX ADMIN — CETIRIZINE HYDROCHLORIDE 5 MG: 5 TABLET, FILM COATED ORAL at 09:05

## 2022-05-31 RX ADMIN — PROPOFOL 20 MG: 10 INJECTION, EMULSION INTRAVENOUS at 03:05

## 2022-05-31 RX ADMIN — TAMSULOSIN HYDROCHLORIDE 0.4 MG: 0.4 CAPSULE ORAL at 09:05

## 2022-05-31 RX ADMIN — FLUTICASONE PROPIONATE 100 MCG: 50 SPRAY, METERED NASAL at 09:05

## 2022-05-31 RX ADMIN — IODIXANOL 75 ML: 320 INJECTION, SOLUTION INTRAVASCULAR at 01:05

## 2022-05-31 RX ADMIN — PROPOFOL 10 MG: 10 INJECTION, EMULSION INTRAVENOUS at 03:05

## 2022-05-31 RX ADMIN — LIDOCAINE HYDROCHLORIDE 50 MG: 20 INJECTION, SOLUTION INTRAVENOUS at 03:05

## 2022-05-31 NOTE — PLAN OF CARE
Patient recovered to baseline.  VSS.  Patient seen by MD at bedside.  Discharge instructions reviewed with patient.  Federal cures act read and explained to patient.  Patient verbalized understanding.    Patient escorted back to hospital room w/transporter.  Report given to bedside nurse TOÑA Meza.  Nurse verbalized understanding.  All questions answered.

## 2022-05-31 NOTE — PLAN OF CARE
GI Plan of Care:    EGD today only note a small hiatal hernia. Otherwise, normal EGD. Melena likely from PO iron therapy, and anemia likely predominantly from AOCD    Recommendations:  -okay for discharge today from GI standpoint  -continue PO iron daily  -okay to stop PPI for GI bleeding purposes

## 2022-05-31 NOTE — ANESTHESIA PREPROCEDURE EVALUATION
05/31/2022  Adrian Claudet is a 81 y.o., male with PMH HTN, T2DM, bradycardia, CKD3, h/o RCC (s/p nephrectomy 2015, recent unintentional weight loss), presented with months of dark colored stools and upper abd pain; scheduled for EGD    Past Medical History:   Diagnosis Date    Bradycardia     Colon polyp     benign    Diabetes mellitus     Hyperlipidemia     Hypertension     Inflammatory bowel disease     diverticulitis    Ulcer     Unspecified disorder of kidney and ureter     right kidney mass     Patient Active Problem List   Diagnosis    HTN (hypertension), benign    Hyperlipidemia    Carcinoma of right kidney    Neck pain, chronic    Bradycardia    Upper GI bleed    Melena    Unintentional weight loss    Normocytic anemia    CKD (chronic kidney disease) stage 3, GFR 30-59 ml/min     No current facility-administered medications on file prior to encounter.     Current Outpatient Medications on File Prior to Encounter   Medication Sig Dispense Refill    acetaminophen (TYLENOL) 500 MG tablet Take 500 mg by mouth every 6 (six) hours as needed for Pain.      diphenhydrAMINE (BENADRYL) 25 mg capsule Take 25 mg by mouth daily as needed for Itching.      ferrous sulfate 325 (65 FE) MG EC tablet Take 325 mg by mouth once daily.      glimepiride (AMARYL) 2 MG tablet Take 2 mg by mouth 2 (two) times daily.   3    lisinopril-hydrochlorothiazide (PRINZIDE,ZESTORETIC) 20-12.5 mg per tablet TAKE 2 TABLETS BY MOUTH ONCE DAILY (Patient taking differently: Take 1 tablet by mouth once daily.) 180 tablet 1    metformin (GLUCOPHAGE) 500 MG tablet Take 500 mg by mouth 2 (two) times daily with meals.   3    multivit-min-FA-lycopen-lutein (CENTRUM SILVER) 0.4 mg-300 mcg- 250 mcg Tab Take 1 tablet by mouth once daily.      pantoprazole (PROTONIX) 40 MG tablet Take 40 mg by mouth once daily.   5     sucralfate (CARAFATE) 1 gram tablet Take 1 g by mouth 3 (three) times daily.      tamsulosin (FLOMAX) 0.4 mg Cap Take 1 capsule (0.4 mg total) by mouth once daily. 90 capsule 3    tramadol (ULTRAM) 50 mg tablet Take 50 mg by mouth 3 (three) times daily as needed.  2     Review of patient's allergies indicates:  No Known Allergies  Social History     Socioeconomic History    Marital status:    Tobacco Use    Smoking status: Never Smoker   Substance and Sexual Activity    Alcohol use: No     Alcohol/week: 0.0 standard drinks    Sexual activity: Yes     Partners: Female     Recent Labs   Lab 05/31/22  0523   WBC 3.39*   RBC 3.13*   HGB 9.7*   HCT 29.3*      MCV 94   MCH 31.0   MCHC 33.1     Recent Labs   Lab 05/31/22  0523   CALCIUM 9.1   PROT 6.0      K 4.8   CO2 27      BUN 22   CREATININE 1.4   ALKPHOS 45*   ALT 15   AST 17   BILITOT 0.4           Pre-op Assessment    I have reviewed the Patient Summary Reports.     I have reviewed the Nursing Notes. I have reviewed the NPO Status.   I have reviewed the Medications.     Review of Systems  Anesthesia Hx:  No problems with previous Anesthesia    Social:  Former Smoker, No Alcohol Use    Hematology/Oncology:         -- Anemia: --  Cancer in past history:  Other (see Oncology comments) surgery    EENT/Dental:EENT/Dental Normal   Cardiovascular:   Exercise tolerance: good Hypertension, well controlled    Pulmonary:  Pulmonary Normal    Renal/:   Chronic Renal Disease, CRI    Hepatic/GI:   PUD, (Hx 2/2 alcohol use 40 years ago)    Musculoskeletal:  Musculoskeletal Normal    Neurological:  Neurology Normal    Endocrine:   Diabetes, well controlled, type 2    Psych:  Psychiatric Normal           Physical Exam  General: Cooperative, Alert, Oriented and Cachexia    Airway:  Mallampati: II   Mouth Opening: Normal  TM Distance: Normal  Tongue: Normal  Neck ROM: Extension Decreased    Dental:  Intact        Anesthesia Plan  Type of Anesthesia,  risks & benefits discussed:    Anesthesia Type: Gen ETT, MAC  Intra-op Monitoring Plan: Standard ASA Monitors  Induction:  IV  Informed Consent: Informed consent signed with the Patient and all parties understand the risks and agree with anesthesia plan.  All questions answered.   ASA Score: 3  Day of Surgery Review of History & Physical: H&P Update referred to the surgeon/provider.    Ready For Surgery From Anesthesia Perspective.     .

## 2022-05-31 NOTE — PROGRESS NOTES
"[facility] Resident HO-I Progress Note    Admitting Team: [facility] Team A  Attending: AMISH Moore M.D.  Resident: MORAIMA Vincent M.D.  Intern: MARY Kern M.D.    Subjective:      Patient seen and examined at bedside this AM. Resting comfortably in bed and in NAD.  Had no further bowel movements overnight.  Denies chest pain, or shortness of breath.  Has intermittent right sided abdominal pain without nausea or vomiting.  Verbalizes understanding of current treatment plan.    Objective:   Last 24 Hour Vital Signs:  BP  Min: 113/58  Max: 205/85  Temp  Av.6 °F (36.4 °C)  Min: 96.1 °F (35.6 °C)  Max: 98.7 °F (37.1 °C)  Pulse  Av.8  Min: 46  Max: 74  Resp  Av  Min: 15  Max: 32  SpO2  Av.7 %  Min: 98 %  Max: 100 %  Height  Av' 7" (170.2 cm)  Min: 5' 7" (170.2 cm)  Max: 5' 7" (170.2 cm)  Weight  Av.8 kg (131 lb 13.2 oz)  Min: 55.6 kg (122 lb 9.2 oz)  Max: 62.1 kg (136 lb 14.5 oz)  No intake/output data recorded.    Physical Examination:  Physical Exam  Vitals and nursing note reviewed.   Constitutional:       General: He is awake. He is not in acute distress.     Appearance: He is underweight.   HENT:      Mouth/Throat:      Mouth: Mucous membranes are moist.      Pharynx: Oropharynx is clear.   Eyes:      General: No scleral icterus.     Extraocular Movements: Extraocular movements intact.      Conjunctiva/sclera: Conjunctivae normal.   Cardiovascular:      Rate and Rhythm: Regular rhythm. Bradycardia present.      Pulses: Normal pulses.      Heart sounds: Normal heart sounds. No murmur heard.  Pulmonary:      Effort: Pulmonary effort is normal. No respiratory distress.      Breath sounds: Normal breath sounds.   Abdominal:      General: Abdomen is flat. Bowel sounds are normal. There is no distension.      Palpations: Abdomen is soft.   Musculoskeletal:         General: Normal range of motion.      Cervical back: Normal range of motion and neck supple.      Right lower leg: No edema.      Left lower leg: " No edema.   Skin:     General: Skin is warm and dry.      Capillary Refill: Capillary refill takes less than 2 seconds.   Neurological:      General: No focal deficit present.      Mental Status: He is alert and oriented to person, place, and time.   Psychiatric:         Mood and Affect: Mood normal.         Behavior: Behavior normal.     Laboratory:  Recent Labs   Lab 05/30/22  1046 05/31/22  0523   WBC 3.42* 3.39*   HGB 9.7* 9.7*   HCT 29.9* 29.3*    210     --    K 4.5  --      --    CREATININE 1.8*  --    BUN 30*  --    CO2 26  --    ALT 14  --    AST 18  --          Other Results:    Radiology:  CXR without acute pathology     Current Medications:     Infusions:       Scheduled:   cetirizine  5 mg Oral Daily    fluticasone propionate  2 spray Each Nostril Daily    pantoprazole  40 mg Intravenous BID    tamsulosin  0.4 mg Oral Daily        PRN:  dextrose 10%, dextrose 10%, glucagon (human recombinant), glucose, glucose, insulin aspart U-100, naloxone, sodium chloride 0.9%    Assessment:     Adrian Claudet is a 81 y.o. male with with a PMHx of Bradycardia, Colon polyp, Diabetes mellitus, Hyperlipidemia, Hypertension, Inflammatory bowel disease, gastric ulcer, and renal cell carcinoma of the right kidney s/p nephrectomy admitted to [facility] for GI bleed.       Plan:     GI bleed with epigastric pain  2-3 months dark stool with epigastric pain  H/H 9.7/23.9 on admission   Protonix 80 mg IV x1 in ED  Occult blood negative in ED  PLAN  Protonix 40 mg IV BID  GI consulted;  EGD tomorrow   Type and screen with 2 lg bore PIV   Lipase pending      Unintentional weight loss in setting of hx of Renal cell carcinoma   History of RCC s/p right nephrectomy in 2015  15-20 lbs unintentional weight loss  Colonoscopy within last 10 yrs per patient/ unknown results   PLAN  Will have EGD on 5/31  Will need CT abd/pelvis with IV contrast to rule out possible metastatic disease      Normocytic, asymptomatic  anemia in setting of GI bleed  H/H 9.7/23.9 on admission  MCV 96  PLAN  GI consult placed; see above   Iron studies   Transfusion goal >7     YUNIOR vs CKD 3b   Baseline 1.5 (3 yrs ago); 1.8 on admission  1 L LR bolus x1  Continue to monitor   PCP follow-up      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  On metformin 500 BID and Glimepiride 2 mg BID at home   Glucose 222 on admission  PLAN  A1c pending   Sliding scale insulin while admitted      HTN  On home lisinopril-hctz 20-12.5 mg daily   Will hold antihypertensive medications at this time in setting of YUNIOR vs CKD 3b  Will continue to monitor and consider restarting home medications when appropriate      HCM  Lipid panel   UTD on COVID   Needs flu and Tdap   UTD on PNA vaccine   Will need colonoscopy if not done during admission            Diet: diabetic; NPO at midnight   Code: full  PPx: held in light of GI bleed   Dispo: pending further work-up      Luis Alberto Kern MD  [facility]  [facility] Hospitalist Team A    [facility] Medicine Hospitalist Pager numbers:   [facility] Hospitalist Medicine Team A (Teresa/Estephanie): [phone]  [facility] Hospitalist Medicine Team B (Adalberto/Elise):  [phone]

## 2022-05-31 NOTE — PLAN OF CARE
Eyal met with pt and pt's family to discuss d/c planning. Pt lives with pt's wife Velvet [phone]. Pt has no DME and no HH. Pt drives himself to doctor appointments. Pt was independent prior admit. Pt's family will transport pt to doctor appointments. Sw encouraged pt and pt's family to call with any questions or concerns. Sw will continue to follow pt for d/c planning.     Future Appointments   Date Time Provider Department Center   6/14/2022 10:00 AM Krystin Mariscal MD [facility]         05/31/22 1413   Discharge Assessment   Assessment Type Discharge Planning Assessment   Confirmed/corrected address, phone number and insurance Yes   Confirmed Demographics Correct on Facesheet   Source of Information patient   Lives With spouse   Facility Arrived From: home   Do you expect to return to your current living situation? Yes   Do you have help at home or someone to help you manage your care at home? Yes   Who are your caregiver(s) and their phone number(s)? pt's wife Velvet [phone]   Prior to hospitilization cognitive status: Alert/Oriented   Current cognitive status: Alert/Oriented   Walking or Climbing Stairs Difficulty none   Dressing/Bathing Difficulty none   Equipment Currently Used at Home none   Readmission within 30 days? No   Patient currently being followed by outpatient case management? No   Do you currently have service(s) that help you manage your care at home? No   Do you have prescription coverage? Yes   Coverage [employer] Managed Medicare   Do you have any problems affording any of your prescribed medications? No   Who is going to help you get home at discharge? pt's wife Velvet [phone]   How do you get to doctors appointments? car, drives self   Are you on dialysis? No   Discharge Plan A Home with family   Discharge Plan B Home Health   DME Needed Upon Discharge    (TBD)   Discharge Plan discussed with: Patient   Discharge Barriers Identified None    Relationship/Environment   Name(s) of Who Lives With Patient pt's wife Velvet [phone]

## 2022-05-31 NOTE — DISCHARGE INSTRUCTIONS
Your EGD showed a small hernia but was otherwise normal. Please continue taking your iron pills. As per the GI doctors, they think your dark stools were from your iron pills, not a bleed. We have sent a referral to set up your colonoscopy, please schedule it at your earliest convenience!

## 2022-05-31 NOTE — PLAN OF CARE
Discharge orders noted. No DME or HH ordered. PCC appointment scheduled. No further CM needs.    Clear to discharge from CM standpoint.    Patient Contacts    Name Relation Home Work Mobile   Claudet,Lydia Spouse [phone]       Future Appointments   Date Time Provider Department Center   6/14/2022 10:00 AM Krystin Mariscal MD [facility]         05/31/22 1855   Final Note   Assessment Type Final Discharge Note   Anticipated Discharge Disposition Home   Hospital Resources/Appts/Education Provided Appointments scheduled by Navigator/Coordinator   Post-Acute Status   Discharge Delays None known at this time     Juliet Call RN    [phone]

## 2022-05-31 NOTE — OR NURSING
Patient scheduled for EGD today, chart reviewed and report taken from Susana (nurse).  NPO since MN, IV access present and recent Covid test negative. AAO x3, procedural and anesthesia consents pnd.

## 2022-05-31 NOTE — PROGRESS NOTES
05/30/22 2008    Notification    Notified? Yes   Admission   Initial VN Admission Questions Complete   Communication Issues? Patient Hearing   Shift   Virtual Nurse - Rounding Complete   Safety/Activity   Patient Rounds bed in low position;call light in patient/parent reach;visualized patient   Safety Promotion/Fall Prevention Fall Risk reviewed with patient/family;medications reviewed;instructed to call staff for mobility;side rails raised x 2

## 2022-05-31 NOTE — ANESTHESIA POSTPROCEDURE EVALUATION
Anesthesia Post Evaluation    Patient: Adrian Claudet    Procedure(s) Performed: Procedure(s) (LRB):  EGD (ESOPHAGOGASTRODUODENOSCOPY) (N/A)    Final Anesthesia Type: MAC      Patient location during evaluation: GI PACU  Patient participation: Yes- Able to Participate  Level of consciousness: awake and alert  Post-procedure vital signs: reviewed and stable  Pain management: adequate  Airway patency: patent    PONV status at discharge: No PONV  Anesthetic complications: no      Cardiovascular status: hemodynamically stable  Respiratory status: unassisted, spontaneous ventilation and room air  Hydration status: euvolemic  Follow-up not needed.          Vitals Value Taken Time   /66 05/31/22 1535   Temp 37.2 °C (99 °F) 05/31/22 1535   Pulse 77 05/31/22 1535   Resp 13 05/31/22 1535   SpO2 100 % 05/31/22 1535         No case tracking events are documented in the log.      Pain/Davon Score: Davon Score: 8 (5/31/2022  3:35 PM)

## 2022-05-31 NOTE — TELEPHONE ENCOUNTER
[facility] RN met with patient and patient family regarding [facility] hospital follow up upon discharge. Pt/patient's family agreeable to hospital follow up. Patient requested that nurse speak to wife regarding appointment. RN informed pt of scheduled appointment and that appointment will also appear on d/c AVS. Patient informed to bring portable home O2 if used and all medication bottles to [facility] follow up appointment.  [facility] information handout, appointment letter and folder provided to patient. Pt states himself or spouse will provide transportation to appointment.    Patient Contact info: [phone]    Barriers to attending [facility] visit: location of appt    Future Appointments   Date Time Provider Department Center   6/14/2022 10:00 AM Krystin Mariscal MD [facility]     [facility] Nurse attempted to call patient wife Velvet to discuss [facility] hospital visit but no answer noted. Unable to leave .

## 2022-05-31 NOTE — PR[employer]ATION PATIENT INSTRUCTIONS
Discharge Summary/Instructions after an Endoscopic Procedure  Patient Name: Adrian Claudet  Patient MRN: [id number]  Patient YOB: 1940  Tuesday, May 31, 2022  Zhou Hernandez MD  Dear patient,  As a result of recent federal legislation (The Federal Cures Act), you may   receive lab or pathology results from your procedure in your [facility]   account before your physician is able to contact you. Your physician or   their representative will relay the results to you with their   recommendations at their soonest availability.  Thank you,  Your health is very important to us during the Covid Crisis. Following your   procedure today, you will receive a daily text for 2 weeks asking about   signs or symptoms of Covid 19.  Please respond to this text when you   receive it so we can follow up and keep you as safe as possible.   RESTRICTIONS:  During your procedure today, you received medications for sedation.  These   medications may affect your judgment, balance and coordination.  Therefore,   for 24 hours, you have the following restrictions:   - DO NOT drive a car, operate machinery, make legal/financial decisions,   sign important papers or drink alcohol.    ACTIVITY:  Today: no heavy lifting, straining or running due to procedural   sedation/anesthesia.  The following day: return to full activity including work.  DIET:  Eat and drink normally unless instructed otherwise.     TREATMENT FOR COMMON SIDE EFFECTS:  - Mild abdominal pain, nausea, belching, bloating or excessive gas:  rest,   eat lightly and use a heating pad.  - Sore Throat: treat with throat lozenges and/or gargle with warm salt   water.  - Because air was used during the procedure, expelling large amounts of air   from your rectum or belching is normal.  - If a bowel prep was taken, you may not have a bowel movement for 1-3 days.    This is normal.  SYMPTOMS TO WATCH FOR AND REPORT TO YOUR PHYSICIAN:  1. Abdominal pain or bloating, other  than gas cramps.  2. Chest pain.  3. Back pain.  4. Signs of infection such as: chills or fever occurring within 24 hours   after the procedure.  5. Rectal bleeding, which would show as bright red, maroon, or black stools.   (A tablespoon of blood from the rectum is not serious, especially if   hemorrhoids are present.)  6. Vomiting.  7. Weakness or dizziness.  GO DIRECTLY TO THE NEAREST EMERGENCY ROOM IF YOU HAVE ANY OF THE FOLLOWING:      Difficulty breathing              Chills and/or fever over 101 F   Persistent vomiting and/or vomiting blood   Severe abdominal pain   Severe chest pain   Black, tarry stools   Bleeding- more than one tablespoon   Any other symptom or condition that you feel may need urgent attention  Your doctor recommends these additional instructions:  If any biopsies were taken, your doctors clinic will contact you in 1 to 2   weeks with any results.  - Discharge patient to home.   - okay to stop PPI therapy for purposes of GI bleeding  - Resume previous diet.   - Continue present medications.  For questions, problems or results please call your physician - Zhou Hernandez MD.  EMERGENCY PHONE NUMBER: [phone],  LAB RESULTS: [phone]  IF A COMPLICATION OR EMERGENCY SITUATION ARISES AND YOU ARE UNABLE TO REACH   YOUR PHYSICIAN - GO DIRECTLY TO THE EMERGENCY ROOM.  Zhou Hernandez MD  5/31/2022 3:35:52 PM  This report has been verified and signed electronically.  Dear patient,  As a result of recent federal legislation (The Federal Cures Act), you may   receive lab or pathology results from your procedure in your [facility]   account before your physician is able to contact you. Your physician or   their representative will relay the results to you with their   recommendations at their soonest availability.  Thank you,  PROVATION

## 2022-05-31 NOTE — DISCHARGE SUMMARY
[facility] Hospital Medicine Discharge Summary    Primary Team: [facility] Hospitalist Team A  Attending Physician: Elvi Moore MD  Resident: Carlton  Intern: Concha    Date of Admit: 5/30/2022  Date of Discharge: 5/31/2022    Discharge to: Home  Condition: Stable    Discharge Diagnoses     Patient Active Problem List   Diagnosis    HTN (hypertension), benign    Hyperlipidemia    Carcinoma of right kidney    Neck pain, chronic    Bradycardia    Upper GI bleed    Melena    Unintentional weight loss    Normocytic anemia    CKD (chronic kidney disease) stage 3, GFR 30-59 ml/min       Consultants and Procedures     Consultants:  Gastroenterology    Procedures:   EGD    Brief History of Present Illness      HPI: Adrian Claudet is a 81 y.o. man with a PMH Diabetes mellitus 2, Hyperlipidemia, Hypertension, and h/o RCC s/p resection who presented for dark colored stools for past 2-3 months. Pt reports several months of having black colored stools with intermittent maroon colored stools as well. This is associated with epigastric abdominal pain.  Denies dizziness, light headedness or weakness and is able to cut grass and work in his garden daily as he has done in the past.  He has a history of arthritis in which he takes tylenol PRN and Ultram.  He denies NSAIDs.  He and his wife do note, however, that he has lost a significant amount of weight and has early satiety over the past year.  He has a previous history of renal cell carcinoma s/p nephrectomy but has not followed with urology in about 3 years.  Of note, he was prescribed sucralfate on 5/20 by his PCP.    Hospital Course:  Patient was admitted to [facility] for further evaluation and management of melena.  Patient during hospitalization had 1 dark colored stool however hemoglobin remained stable.  Gastroenterology performed an EGD which was unremarkable for significant cause of bleeding.  Patient also had a CT abdomen which did not demonstrate any  significant abnormality.  Patient have follow-up in the outpatient with GI for routine malignancy screening including colonoscopy.    For the full HPI please refer to the History & Physical from this admission.    Hospital Course By Problem with Pertinent Findings     GI bleed with epigastric pain  - 2-3 months dark stool with epigastric pain  - Hb 9.7 throughout hospital stay  - IV PPI during stay and discharged on protonix BID  - EGD unremarkable for any signs of bleeding.  - follow-up with gastroenterology in the outpatient setting for colonoscopy    Unintentional weight loss in setting of hx of Renal cell carcinoma   - History of RCC s/p right nephrectomy in 2015  - 15-20 lbs unintentional weight loss  Colonoscopy within last 10 yrs per patient/ unknown results   - EGD in CT abdomen both unremarkable for any clear source of weight loss.  - patient will need colonoscopy in the outpatient to fully rule out malignancy.       Chronic blood loss anemia 2/2 GI bleed  - hemoglobin 9.7 on admission complaining of melena  - remained stable throughout hospital stay  - iron panel demonstrated iron 31 TIBC 318, transferrin 215, saturated iron 10, ferritin 115  - patient will follow-up with Gastroenterology for colonoscopy and PCP for continued monitoring and management.     YUNIOR vs CKD 3b, resolved  - creatinine 1.8 on admission received fluid resuscitation and improved to 1.4 on day of discharge  - baseline creatinine approximately 1.5  - follow-up with PCP for continued monitoring and management     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  - On metformin 500 BID and Glimepiride 2 mg BID at home   - A1c 7.3  - continue all medications on discharge and follow-up with PCP for continued adjustment the regimen    History of renal cell carcinoma  - patient previously diagnosed with renal cell carcinoma status post resection 2015  - has not seen urology since 2019  - follow-up for continued monitoring     HTN  - On  home lisinopril-hctz 20-12.5 mg daily   - resumed home antihypertensives on discharge    Hyperlipidemia  -cholesterol 183, HDL 79, HDL 92, triglycerides 59  - will defer medication management this time, follow-up with PCP for continued monitoring.     HCM  - UTD on COVID   - Needs flu and Tdap   - UTD on PNA vaccine   - not up-to-date on colonoscopy    Discharge Medications        Medication List      CONTINUE taking these medications    acetaminophen 500 MG tablet  Commonly known as: TYLENOL     CENTRUM SILVER 0.4 mg-300 mcg- 250 mcg Tab  Generic drug: multivit-min-FA-lycopen-lutein     diphenhydrAMINE 25 mg capsule  Commonly known as: BENADRYL     ferrous sulfate 325 (65 FE) MG EC tablet     glimepiride 2 MG tablet  Commonly known as: AMARYL     lisinopriL-hydrochlorothiazide 20-12.5 mg per tablet  Commonly known as: PRINZIDE,ZESTORETIC  Take 1 tablet by mouth once daily.     metFORMIN 500 MG tablet  Commonly known as: GLUCOPHAGE     pantoprazole 40 MG tablet  Commonly known as: PROTONIX     sucralfate 1 gram tablet  Commonly known as: CARAFATE     tamsulosin 0.4 mg Cap  Commonly known as: FLOMAX  Take 1 capsule (0.4 mg total) by mouth once daily.        STOP taking these medications    traMADoL 50 mg tablet  Commonly known as: ULTRAM           Where to Get Your Medications      These medications were sent to [id number] - [facility] [address] [facility] AT [facility] [address]  [address] [address]    Phone: [phone]   · lisinopriL-hydrochlorothiazide 20-12.5 mg per tablet         Discharge Information:   Diet:  Diabetic Diet.    Physical Activity:  As tolerated by patient.              Instructions:  1. Take all medications as prescribed  2. Keep all follow-up appointments  3. Return to the hospital or call your primary care physicians if any worsening symptoms such as fever, chest pain, shortness of breath, return of symptoms, or any other  concerns.    Follow-Up Appointments:  PCP  Urology  Gastroenterology    Julito Vincent MD  [facility] HO-2

## 2022-06-01 NOTE — NURSING
Pt discharged and v/s taken after shift change 183/83 by PCT and then rechecked by nurse 159/69 pt stable and nonsymptomatic. Pt assessed for HTN and if he takes medication at home. Pt verbalized yes. Instructed pt to continue home medications and follow up with primary care doctor as needed. 20g IV access removed from R arm and telemetry monitor D/C and cleaned and returned to telemetr room. No other needs noted.

## 2022-06-01 NOTE — NURSING
VN cued into room to review discharge instructions. Patient's sister at bedside to review instructions with VN.  All sections of discharge instructions on AVS were read to patient and sister.  Sister verbalized all understanding. Allowed time for questions.  Bedside nurse Kaci notified that patient discharge instructions reviewed and patient ready for discharge.

## 2025-04-04 DIAGNOSIS — M54.16 LUMBAR RADICULOPATHY: Primary | ICD-10-CM

## 2025-04-16 ENCOUNTER — HOSPITAL ENCOUNTER (OUTPATIENT)
Dept: RADIOLOGY | Facility: HOSPITAL | Age: 85
Discharge: HOME | End: 2025-04-16
Attending: FAMILY MEDICINE
Payer: MEDICARE

## 2025-04-16 DIAGNOSIS — M54.16 LUMBAR RADICULOPATHY: ICD-10-CM

## 2025-04-16 PROCEDURE — 72148 MRI LUMBAR SPINE W/O DYE: CPT | Mod: TC,PN

## 2025-04-16 PROCEDURE — 72148 MRI LUMBAR SPINE W/O DYE: CPT | Mod: 26,,, | Performed by: RADIOLOGY
